# Patient Record
Sex: FEMALE | Race: WHITE
[De-identification: names, ages, dates, MRNs, and addresses within clinical notes are randomized per-mention and may not be internally consistent; named-entity substitution may affect disease eponyms.]

---

## 2021-04-23 ENCOUNTER — HOSPITAL ENCOUNTER (EMERGENCY)
Dept: HOSPITAL 76 - ED | Age: 26
Discharge: HOME | End: 2021-04-23
Payer: COMMERCIAL

## 2021-04-23 VITALS — DIASTOLIC BLOOD PRESSURE: 68 MMHG | SYSTOLIC BLOOD PRESSURE: 117 MMHG

## 2021-04-23 DIAGNOSIS — O41.8X10: ICD-10-CM

## 2021-04-23 DIAGNOSIS — Z3A.09: ICD-10-CM

## 2021-04-23 DIAGNOSIS — R10.2: ICD-10-CM

## 2021-04-23 DIAGNOSIS — O26.891: Primary | ICD-10-CM

## 2021-04-23 LAB
ANION GAP SERPL CALCULATED.4IONS-SCNC: 10 MMOL/L (ref 6–13)
BASOPHILS NFR BLD AUTO: 0 10^3/UL (ref 0–0.1)
BASOPHILS NFR BLD AUTO: 0.2 %
BUN SERPL-MCNC: 11 MG/DL (ref 6–20)
CALCIUM UR-MCNC: 9.5 MG/DL (ref 8.5–10.3)
CHLORIDE SERPL-SCNC: 108 MMOL/L (ref 101–111)
CO2 SERPL-SCNC: 21 MMOL/L (ref 21–32)
CREAT SERPLBLD-SCNC: 0.4 MG/DL (ref 0.4–1)
EOSINOPHIL # BLD AUTO: 0.4 10^3/UL (ref 0–0.7)
EOSINOPHIL NFR BLD AUTO: 2.8 %
ERYTHROCYTE [DISTWIDTH] IN BLOOD BY AUTOMATED COUNT: 14 % (ref 12–15)
GFRSERPLBLD MDRD-ARVRAT: 196 ML/MIN/{1.73_M2} (ref 89–?)
GLUCOSE SERPL-MCNC: 100 MG/DL (ref 70–100)
HCT VFR BLD AUTO: 41.8 % (ref 37–47)
HGB UR QL STRIP: 13.6 G/DL (ref 12–16)
LYMPHOCYTES # SPEC AUTO: 3.1 10^3/UL (ref 1.5–3.5)
LYMPHOCYTES NFR BLD AUTO: 21.5 %
MCH RBC QN AUTO: 27 PG (ref 27–31)
MCHC RBC AUTO-ENTMCNC: 32.5 G/DL (ref 32–36)
MCV RBC AUTO: 83.1 FL (ref 81–99)
MONOCYTES # BLD AUTO: 0.9 10^3/UL (ref 0–1)
MONOCYTES NFR BLD AUTO: 6.5 %
NEUTROPHILS # BLD AUTO: 9.8 10^3/UL (ref 1.5–6.6)
NEUTROPHILS # SNV AUTO: 14.4 X10^3/UL (ref 4.8–10.8)
NEUTROPHILS NFR BLD AUTO: 68.6 %
NRBC # BLD AUTO: 0 /100WBC
NRBC # BLD AUTO: 0 X10^3/UL
PDW BLD AUTO: 9.5 FL (ref 7.9–10.8)
PLATELET # BLD: 295 10^3/UL (ref 130–450)
POTASSIUM SERPL-SCNC: 3.5 MMOL/L (ref 3.5–5)
RBC MAR: 5.03 10^6/UL (ref 4.2–5.4)
SODIUM SERPLBLD-SCNC: 139 MMOL/L (ref 135–145)

## 2021-04-23 PROCEDURE — 36415 COLL VENOUS BLD VENIPUNCTURE: CPT

## 2021-04-23 PROCEDURE — 76801 OB US < 14 WKS SINGLE FETUS: CPT

## 2021-04-23 PROCEDURE — 85025 COMPLETE CBC W/AUTO DIFF WBC: CPT

## 2021-04-23 PROCEDURE — 86900 BLOOD TYPING SEROLOGIC ABO: CPT

## 2021-04-23 PROCEDURE — 80048 BASIC METABOLIC PNL TOTAL CA: CPT

## 2021-04-23 PROCEDURE — 99284 EMERGENCY DEPT VISIT MOD MDM: CPT

## 2021-04-23 PROCEDURE — 76817 TRANSVAGINAL US OBSTETRIC: CPT

## 2021-04-23 PROCEDURE — 84702 CHORIONIC GONADOTROPIN TEST: CPT

## 2021-04-23 PROCEDURE — 86901 BLOOD TYPING SEROLOGIC RH(D): CPT

## 2021-04-23 NOTE — EXTERNAL MEDICAL SUMMARY RPT
Continuity of Care Document

                            Created on:2021



Patient:LIANNE LI

Sex:Female

:1995

External Reference #:[id number]





Demographics







                          Phone                     Unavailable

 

                          Preferred Language        Unknown

 

                          Marital Status            Unknown

 

                          Congregation Affiliation     Unknown

 

                          Race                      Unknown

 

                          Ethnic Group              Unknown









Author







                          Organization              [facility]

 

                          Address                    [place] [id number]

 

                          Phone                     [phone]









Social History







                     date                description         facility

 

                     [id number]

## 2021-04-23 NOTE — ED PHYSICIAN DOCUMENTATION
PD HPI ABD PAIN





- Stated complaint


Stated Complaint: ABD PX,NAUSEA





- Chief complaint


Chief Complaint: Abd Pain





- History obtained from


History obtained from: Patient





- Additional information


Additional information: 





She has been breast-feeding her son socially has not had any menses.  Maybe a 

slight one in January.  She found out she was pregnant about a month ago.  She 

does not know how far along.  Last night she started having cramping.  There is 

no associating bleeding.  She has been nauseous today as well.





Review of Systems


Ten Systems: 10 systems reviewed and negative


Constitutional: reports: Reviewed and negative


Throat: reports: Reviewed and negative


Cardiac: reports: Reviewed and negative





PD PAST MEDICAL HISTORY





- Past Medical History


Cardiovascular: None


Respiratory: None


GI: GERD





- Past Surgical History


Past Surgical History: Yes


General: Cholecystectomy





- Present Medications


Home Medications: 


                                Ambulatory Orders











 Medication  Instructions  Recorded  Confirmed


 


Metoclopramide [Reglan] 10 mg PO Q6H PRN #20 tablet 04/23/21 


 


Sertraline [Zoloft] 75 mg PO DAILY 04/23/21 04/23/21














- Allergies


Allergies/Adverse Reactions: 


                                    Allergies











Allergy/AdvReac Type Severity Reaction Status Date / Time


 


Latex, Natural Rubber Allergy  Rash Verified 04/23/21 17:43














- Social History


Does the pt smoke?: No


Smoking Status: Never smoker


Does the pt drink ETOH?: Yes


Does the pt have substance abuse?: No





- Immunizations


Immunizations are current?: Yes





- POLST


Patient has POLST: No





PD ED PE NORMAL





- Vitals


Vital signs reviewed: Yes





- General


General: Alert and oriented X 3, No acute distress





- HEENT


HEENT: PERRL, EOMI





- Neck


Neck: Supple, no meningeal sign, No bony TTP





- Cardiac


Cardiac: RRR, No murmur





- Respiratory


Respiratory: No respiratory distress, Clear bilaterally





- Abdomen


Abdomen: Non tender





- Female 


Female : Other (I was unable to visualize a intrauterine pregnancy on bedside 

ultrasound.)





- Neuro


Neuro: Alert and oriented X 3, Normal speech





- Psych


Psych: Normal mood, Normal affect





Results





- Vitals


Vitals: 


                               Vital Signs - 24 hr











  04/23/21 04/23/21





  17:35 20:00


 


Temperature 36.7 C 36.7 C


 


Heart Rate 79 75


 


Respiratory 16 16





Rate  


 


Blood Pressure 124/73 117/68


 


O2 Saturation 100 100








                                     Oxygen











O2 Source                      Room air

















- Labs


Labs: 


                                Laboratory Tests











  04/23/21 04/23/21 04/23/21





  18:21 18:21 18:21


 


WBC   14.4 H 


 


RBC   5.03 


 


Hgb   13.6 


 


Hct   41.8 


 


MCV   83.1 


 


MCH   27.0 


 


MCHC   32.5 


 


RDW   14.0 


 


Plt Count   295 


 


MPV   9.5 


 


Neut # (Auto)   9.8 H 


 


Lymph # (Auto)   3.1 


 


Mono # (Auto)   0.9 


 


Eos # (Auto)   0.4 


 


Baso # (Auto)   0.0 


 


Absolute Nucleated RBC   0.00 


 


Nucleated RBC %   0.0 


 


Sodium    139


 


Potassium    3.5


 


Chloride    108


 


Carbon Dioxide    21


 


Anion Gap    10.0


 


BUN    11


 


Creatinine    0.4


 


Estimated GFR (MDRD)    196


 


Glucose    100


 


Calcium    9.5


 


HCG, Quant   


 


Blood Type  O POSITIVE  














  04/23/21





  18:21


 


WBC 


 


RBC 


 


Hgb 


 


Hct 


 


MCV 


 


MCH 


 


MCHC 


 


RDW 


 


Plt Count 


 


MPV 


 


Neut # (Auto) 


 


Lymph # (Auto) 


 


Mono # (Auto) 


 


Eos # (Auto) 


 


Baso # (Auto) 


 


Absolute Nucleated RBC 


 


Nucleated RBC % 


 


Sodium 


 


Potassium 


 


Chloride 


 


Carbon Dioxide 


 


Anion Gap 


 


BUN 


 


Creatinine 


 


Estimated GFR (MDRD) 


 


Glucose 


 


Calcium 


 


HCG, Quant  601209.00


 


Blood Type 














PD MEDICAL DECISION MAKING





- ED course


ED course: 





25-year-old woman presents with pelvic pain in pregnancy.  Per ultrasonographer 

she has a live IUP with small subchorionic hemorrhage consistent with a 9-week 

0-day gestation.





Departure





- Departure


Disposition: 01 Home, Self Care


Clinical Impression: 


Pregnancy


Qualifiers:


 Weeks of gestation: 9 weeks Qualified Code(s): Z3A.09 - 9 weeks gestation of 

pregnancy





Pelvic pain affecting pregnancy


Qualifiers:


 Trimester: first trimester Qualified Code(s): O26.891 - Other specified 

pregnancy related conditions, first trimester





Instructions:  ED Prenatal Care, ED Preg Established Normal Sxs


Follow-Up: 


[facility] [Provider Group]


Prescriptions: 


Metoclopramide [Reglan] 10 mg PO Q6H PRN #20 tablet


 PRN Reason: nausea or headache


Comments: 


Estimated due date November 26, 2021.  Return if worsening.


Discharge Date/Time: 04/23/21 20:44

## 2021-05-14 ENCOUNTER — HOSPITAL ENCOUNTER (OUTPATIENT)
Dept: HOSPITAL 76 - LAB.WC | Age: 26
Discharge: HOME | End: 2021-05-14
Attending: ADVANCED PRACTICE MIDWIFE
Payer: COMMERCIAL

## 2021-05-14 DIAGNOSIS — Z32.01: Primary | ICD-10-CM

## 2021-05-14 LAB
AMPHET UR QL SCN: NEGATIVE
BARBITURATES UR QL SCN>300 NG/ML: NEGATIVE
BENZODIAZ UR QL SCN: NEGATIVE
CLARITY UR REFRACT.AUTO: CLEAR
COCAINE UR-SCNC: NEGATIVE UMOL/L
CRYSTALS URNS QL MICRO: (no result) /LPF
GLUCOSE UR QL STRIP.AUTO: NEGATIVE MG/DL
KETONES UR QL STRIP.AUTO: NEGATIVE MG/DL
METHADONE UR QL SCN: NEGATIVE
METHAMPHET UR QL SCN: NEGATIVE
NITRITE UR QL STRIP.AUTO: NEGATIVE
OPIATES UR QL SCN: NEGATIVE
PH UR STRIP.AUTO: 5.5 PH (ref 5–7.5)
PROT UR STRIP.AUTO-MCNC: 30 MG/DL
RBC # UR STRIP.AUTO: (no result) /UL
RBC # URNS HPF: (no result) /HPF (ref 0–5)
SP GR UR STRIP.AUTO: >=1.03 (ref 1–1.03)
SQUAMOUS URNS QL MICRO: (no result)
THC UR QL SCN: NEGATIVE
UROBILINOGEN UR QL STRIP.AUTO: (no result) E.U./DL
UROBILINOGEN UR STRIP.AUTO-MCNC: NEGATIVE MG/DL
VOLATILE DRUGS POS SERPL SCN: (no result)
WBC # UR MANUAL: (no result) /HPF (ref 0–5)

## 2021-05-14 PROCEDURE — 80306 DRUG TEST PRSMV INSTRMNT: CPT

## 2021-05-14 PROCEDURE — 87086 URINE CULTURE/COLONY COUNT: CPT

## 2021-05-14 PROCEDURE — 81001 URINALYSIS AUTO W/SCOPE: CPT

## 2021-05-25 ENCOUNTER — HOSPITAL ENCOUNTER (OUTPATIENT)
Dept: HOSPITAL 76 - DI | Age: 26
Discharge: HOME | End: 2021-05-25
Attending: ADVANCED PRACTICE MIDWIFE
Payer: COMMERCIAL

## 2021-05-25 DIAGNOSIS — Z36.89: ICD-10-CM

## 2021-05-25 DIAGNOSIS — Z32.01: Primary | ICD-10-CM

## 2021-05-25 LAB
BASOPHILS NFR BLD AUTO: 0 10^3/UL (ref 0–0.1)
BASOPHILS NFR BLD AUTO: 0.2 %
EOSINOPHIL # BLD AUTO: 0.4 10^3/UL (ref 0–0.7)
EOSINOPHIL NFR BLD AUTO: 2.9 %
ERYTHROCYTE [DISTWIDTH] IN BLOOD BY AUTOMATED COUNT: 13.9 % (ref 12–15)
HCT VFR BLD AUTO: 40.1 % (ref 37–47)
HGB UR QL STRIP: 13 G/DL (ref 12–16)
LYMPHOCYTES # SPEC AUTO: 3.1 10^3/UL (ref 1.5–3.5)
LYMPHOCYTES NFR BLD AUTO: 22.4 %
MCH RBC QN AUTO: 27.1 PG (ref 27–31)
MCHC RBC AUTO-ENTMCNC: 32.4 G/DL (ref 32–36)
MCV RBC AUTO: 83.7 FL (ref 81–99)
MONOCYTES # BLD AUTO: 0.9 10^3/UL (ref 0–1)
MONOCYTES NFR BLD AUTO: 6.5 %
NEUTROPHILS # BLD AUTO: 9.3 10^3/UL (ref 1.5–6.6)
NEUTROPHILS # SNV AUTO: 13.8 X10^3/UL (ref 4.8–10.8)
NEUTROPHILS NFR BLD AUTO: 67.2 %
NRBC # BLD AUTO: 0 /100WBC
NRBC # BLD AUTO: 0 X10^3/UL
PDW BLD AUTO: 9.6 FL (ref 7.9–10.8)
PLATELET # BLD: 261 10^3/UL (ref 130–450)
RBC MAR: 4.79 10^6/UL (ref 4.2–5.4)

## 2021-05-25 PROCEDURE — 36415 COLL VENOUS BLD VENIPUNCTURE: CPT

## 2021-05-25 PROCEDURE — 86762 RUBELLA ANTIBODY: CPT

## 2021-05-25 PROCEDURE — 85025 COMPLETE CBC W/AUTO DIFF WBC: CPT

## 2021-05-25 PROCEDURE — 87340 HEPATITIS B SURFACE AG IA: CPT

## 2021-05-25 PROCEDURE — 86850 RBC ANTIBODY SCREEN: CPT

## 2021-05-25 PROCEDURE — 86592 SYPHILIS TEST NON-TREP QUAL: CPT

## 2021-05-25 PROCEDURE — 86900 BLOOD TYPING SEROLOGIC ABO: CPT

## 2021-05-25 PROCEDURE — 86787 VARICELLA-ZOSTER ANTIBODY: CPT

## 2021-05-25 PROCEDURE — 86901 BLOOD TYPING SEROLOGIC RH(D): CPT

## 2021-05-25 PROCEDURE — 86803 HEPATITIS C AB TEST: CPT

## 2021-05-25 PROCEDURE — 87389 HIV-1 AG W/HIV-1&-2 AB AG IA: CPT

## 2021-05-27 NOTE — ULTRASOUND REPORT
PROCEDURE:  OB 14+ Weeks

 

INDICATIONS:  Positive pregnancy test

 

OUTSIDE/PRIOR DATING DATA:  

Last menstrual period (LMP): Unknown.  

LMP-based estimated date of delivery (BLANCA): Unknown.  

First dating scan (date and location): 4/23/2021.  

Estimated date of delivery (BLANCA) from first dating scan: 11/26/2021.  

The below data below was generated using the BLANCA of 11/26/2020

 

TECHNIQUE:  

Real-time scanning was performed of the fetus, with image documentation and biometric measurements.  


Endovaginal scanning:  Not performed

 

COMPARISON:  None.

 

 

FINDINGS:  

 

General:  A single living intrauterine gestation is present.  

Placenta:  Placental position is posterior, without previa.  

Fetal heart rate:  148 beats per minute.  

Maternal cervical canal: 4.8 cm long; normal length is 2.5 cm or more.  

 

Fetal biometrics:  

Biparietal diameter:  2.5 cm, 14 weeks 1 day.

Head circumference:  9.2 cm, 14 weeks 1 day.

Abdominal circumference:  7.5 cm, 14 weeks 0 days.

Femur length:  1.4 cm, 14 weeks 2 days.

Estimated gestational age from initial scan: not applicable.  

Composite gestational age from present scan:  14 weeks 1 day.

 

 

IMPRESSION:  Single live intrauterine gestation with composite gestational age of 14 weeks 1 day.

 

Reviewed by: Timo Luna MD on 5/27/2021 8:13 AM PDT

Approved by: Timo Luna MD on 5/27/2021 8:13 AM PDT

 

 

Station ID:  [id number]

## 2021-06-30 ENCOUNTER — HOSPITAL ENCOUNTER (OUTPATIENT)
Dept: HOSPITAL 76 - LAB.WC | Age: 26
Discharge: HOME | End: 2021-06-30
Attending: ADVANCED PRACTICE MIDWIFE
Payer: COMMERCIAL

## 2021-06-30 DIAGNOSIS — Z34.90: Primary | ICD-10-CM

## 2021-06-30 LAB
AMPHET UR QL SCN: NEGATIVE
BARBITURATES UR QL SCN>300 NG/ML: NEGATIVE
BENZODIAZ UR QL SCN: NEGATIVE
C TRACH DNA SPEC NAA+PROBE-ACNC: NEGATIVE
COCAINE UR-SCNC: NEGATIVE UMOL/L
METHADONE UR QL SCN: NEGATIVE
METHAMPHET UR QL SCN: NEGATIVE
N GONORRHOEA DNA GENITAL QL NAA+PROBE: NEGATIVE
OPIATES UR QL SCN: NEGATIVE
T VAGINALIS RRNA GENITAL QL PROBE: NEGATIVE
THC UR QL SCN: NEGATIVE
VOLATILE DRUGS POS SERPL SCN: (no result)

## 2021-06-30 PROCEDURE — 87086 URINE CULTURE/COLONY COUNT: CPT

## 2021-06-30 PROCEDURE — 87491 CHLMYD TRACH DNA AMP PROBE: CPT

## 2021-06-30 PROCEDURE — 80306 DRUG TEST PRSMV INSTRMNT: CPT

## 2021-06-30 PROCEDURE — 87661 TRICHOMONAS VAGINALIS AMPLIF: CPT

## 2021-06-30 PROCEDURE — 87591 N.GONORRHOEAE DNA AMP PROB: CPT

## 2021-07-14 ENCOUNTER — HOSPITAL ENCOUNTER (OUTPATIENT)
Dept: HOSPITAL 76 - DI | Age: 26
Discharge: HOME | End: 2021-07-14
Attending: ADVANCED PRACTICE MIDWIFE
Payer: COMMERCIAL

## 2021-07-14 DIAGNOSIS — O44.22: Primary | ICD-10-CM

## 2021-07-14 DIAGNOSIS — Z3A.20: ICD-10-CM

## 2021-07-15 NOTE — ULTRASOUND REPORT
PROCEDURE:  OB Detailed Fetal Eval

 

INDICATIONS:  SUPERVISION OF NORMAL PREGNANCY

 

OUTSIDE/PRIOR DATING DATA:  

Last menstrual period (LMP): Unknown.  

LMP-based estimated date of delivery (BLANCA): Not applicable.  

First dating scan (date and location): 4/23/2021.  

Estimated date of delivery (BLANCA) from first dating scan: 11/26/2021.  

The below data below was generated using the ultrasound BLANCA of 11/26/2021

 

TECHNIQUE: 

Real-time scanning was performed of the fetus, with image documentation and biometric measurements.  


 

COMPARISON:  5/25/2021

 

FINDINGS:     

 

General:  A single living intrauterine gestation is present.  

Presentation:  Cephalic

Placenta:  Placental position is posterior, marginal placenta previa.  

Amniotic fluid index:  15.7 cm.  Largest pocket 4.6 cm

Fetal heart rate:  148 beats per minute.  

Maternal cervical canal:  3.7 cm long; normal length is 2.5 cm or more.  

 

Fetal biometrics:  

Biparietal diameter:  4.5 cm. 19 weeks 4 days

Head circumference:  17.7 cm. 20 weeks 1 day

Abdominal circumference:  16.5 cm. 21 weeks 4 days

Femur length:  3.4 cm. 19 weeks 6 days

Estimated gestational age from initial scan: not applicable.  

Composite gestational age from present scan:  20 weeks 3 days

Estimated fetal weight and percentile:  394 g. 62.7 percentile

Measurement variability in biometric dating: +/- 10 days from 12-20 weeks gestation, +/- 2 weeks from
 20-30 weeks gestation, +/- 3 weeks at 30 weeks gestation or later.  

 

Anatomic survey:  

Neuro:  Ventricles are normal at less than 10 mm.  Cisterna magna is normal at 3-11 mm.  Cerebellum i
s normal in size and morphology.  

Nuchal skin fold:  Normal at less than 6 mm between 14 and 20 weeks gestational age.  

Face:  Nose and lips, facial profile are normal.  

Spine:  No evidence for spina bifida.  

Heart: Views of the heart are limited. Diaphragm:  Diaphragm is intact.  

Stomach:  Left-sided stomach is present.  

Kidneys:  No fetal hydronephrosis.  Normal is less than 5 mm in 2nd trimester, less than 7 mm in 3rd 
trimester.  

Cord: Cord insertion is not well visualized. Bladder:  Normal in size.  

Extremities:  All 4 extremities are visualized.  

 

IMPRESSION:  

1. Composite gestational age 20 weeks 3 days. 

2. Estimated fetal weight and percentile 394 g. 62.7 percentile.

3. Marginal placenta previa.

 

Reviewed by: Enrique Peralta on 7/15/2021 4:13 PM PDT

Approved by: Enrique Peralta on 7/15/2021 4:13 PM PDT

 

 

Station ID:  [id number]

## 2021-08-17 ENCOUNTER — HOSPITAL ENCOUNTER (OUTPATIENT)
Dept: HOSPITAL 76 - DI | Age: 26
Discharge: HOME | End: 2021-08-17
Attending: ADVANCED PRACTICE MIDWIFE
Payer: COMMERCIAL

## 2021-08-17 DIAGNOSIS — O44.12: Primary | ICD-10-CM

## 2021-08-17 DIAGNOSIS — Z3A.25: ICD-10-CM

## 2021-08-18 NOTE — ULTRASOUND REPORT
PROCEDURE:  OB F/U or Repeat

 

INDICATIONS:  PLACENTA PREVIA

 

OUTSIDE/PRIOR DATING DATA:  

Last menstrual period (LMP): Not available.  

LMP-based estimated date of delivery (BLANCA): Not available.  

First dating scan (date and location): 4/23/2021.  

Estimated date of delivery (BLANCA) from first dating scan: 11/26/2021.  

The below data below was generated using the above BLANCA of 11/26/2021

 

TECHNIQUE: 

Real-time scanning was performed of the fetus, with image documentation and biometric measurements.  


Endovaginal scanning:  Not needed

 

COMPARISON:  All prior OB ultrasounds for this pregnancy.

 

FINDINGS:  

 

General:  A single living intrauterine gestation is present.  

Presentation:  Variable

Placenta:  Placental position is posterior, without previa.  

Amniotic fluid index:  18.5 cm, normal for gestational age.  

Fetal heart rate:  148 beats per minute.  

Maternal cervical canal:  4.0 cm long; normal length is 2.5 cm or more.  

 

Fetal biometrics:  

Esmated gestational age from initial scan: 25 weeks 4 days.  

 

Other: The inferior placental tip relationship to the internal os of the cervical canal is 9 cm. Ther
e is no evidence of placenta previa. The marginal cord insertion visualization is improved from the p
rior study and the cord insertion is 1.7 cm from the placental border. Three-vessel cord is noted. No
rmal cardiac anatomy including ventricular outflow tracts currently found.

.  

 

IMPRESSION:  Current fetal positioning is variable. There is no sign of placenta previa. Marginal cor
d insertion is better visualized than on prior study and measures 1.7 cm from the placental border. N
ormal ventricular outflow tracts and 4 chamber view of heart, three-vessel umbilical cord is well vis
ualized.

 

Reviewed by: Jamar Wyatt MD on 8/18/2021 11:13 AM PDT

Approved by: Jamar Wyatt MD on 8/18/2021 11:13 AM PDT

 

 

Station ID:  [id number]

## 2021-08-24 ENCOUNTER — HOSPITAL ENCOUNTER (OUTPATIENT)
Dept: HOSPITAL 76 - LAB | Age: 26
Discharge: HOME | End: 2021-08-24
Attending: ADVANCED PRACTICE MIDWIFE
Payer: COMMERCIAL

## 2021-08-24 DIAGNOSIS — N76.0: Primary | ICD-10-CM

## 2021-08-24 LAB
BV DNA PNL VAG NAA+PROBE: NEGATIVE
C GLABRATA DNA BLD QL NAA+PROBE: NEGATIVE
C KRUSEI DNA VAG QL NAA+PROBE: NEGATIVE
CANDIDA DNA VAG QL NAA+PROBE: NEGATIVE
T VAGINALIS RRNA GENITAL QL PROBE: NEGATIVE

## 2021-08-24 PROCEDURE — 87661 TRICHOMONAS VAGINALIS AMPLIF: CPT

## 2021-08-24 PROCEDURE — 87801 DETECT AGNT MULT DNA AMPLI: CPT

## 2021-09-03 ENCOUNTER — HOSPITAL ENCOUNTER (OUTPATIENT)
Dept: HOSPITAL 76 - LAB | Age: 26
Discharge: HOME | End: 2021-09-03
Attending: ADVANCED PRACTICE MIDWIFE
Payer: COMMERCIAL

## 2021-09-03 DIAGNOSIS — Z34.90: Primary | ICD-10-CM

## 2021-09-03 DIAGNOSIS — Z36.89: ICD-10-CM

## 2021-09-03 LAB
ERYTHROCYTE [DISTWIDTH] IN BLOOD BY AUTOMATED COUNT: 14.9 % (ref 12–15)
HCT VFR BLD AUTO: 39.4 % (ref 37–47)
HGB UR QL STRIP: 12.3 G/DL (ref 12–16)
MCH RBC QN AUTO: 26.4 PG (ref 27–31)
MCHC RBC AUTO-ENTMCNC: 31.2 G/DL (ref 32–36)
MCV RBC AUTO: 84.5 FL (ref 81–99)
NEUTROPHILS # SNV AUTO: 14.8 X10^3/UL (ref 4.8–10.8)
PDW BLD AUTO: 10.1 FL (ref 7.9–10.8)
PLATELET # BLD: 264 10^3/UL (ref 130–450)
RBC MAR: 4.66 10^6/UL (ref 4.2–5.4)

## 2021-09-03 PROCEDURE — 36415 COLL VENOUS BLD VENIPUNCTURE: CPT

## 2021-09-03 PROCEDURE — 85027 COMPLETE CBC AUTOMATED: CPT

## 2021-09-03 PROCEDURE — 82950 GLUCOSE TEST: CPT

## 2021-10-24 ENCOUNTER — HOSPITAL ENCOUNTER (OUTPATIENT)
Dept: HOSPITAL 76 - DI | Age: 26
Discharge: HOME | End: 2021-10-24
Attending: ADVANCED PRACTICE MIDWIFE
Payer: COMMERCIAL

## 2021-10-24 DIAGNOSIS — O26.843: Primary | ICD-10-CM

## 2021-10-24 DIAGNOSIS — Z3A.35: ICD-10-CM

## 2021-10-25 NOTE — ULTRASOUND REPORT
PROCEDURE:  OB F/U or Repeat

 

INDICATIONS:  UTERINE SIZE DATE DISCREPANCY 3RD TRIMESTER

 

OUTSIDE/PRIOR DATING DATA:  

Last menstrual period (LMP): None.  

First dating scan (date): April 23, 2021.  

Estimated date of delivery (BLANCA) from first dating scan: November 26, 2021.  

The below data below was generated using the ultrasound BLANCA of November 26, 2021

 

TECHNIQUE: 

Real-time scanning was performed of the fetus, with image documentation and biometric measurements.  


 

COMPARISON:  August 17, 2021

 

FINDINGS:  

 

General:  A single living intrauterine gestation is present.  

Presentation:  Cephalic

Placenta:  Placental position is posterior, without previa.  

Amniotic fluid index:  17.3 cm, appropriate for gestational age.  

Fetal heart rate:  144 beats per minute.  

Maternal cervical canal: Not well seen.

Fetal biometrics:  

Biparietal diameter:  8.79 cm

Head circumference:  31.8 cm

Abdominal circumference:  31.4 cm

Femur length:  6.6 cm

Estimated gestational age from initial scan: not applicable.  

Composite gestational age from present scan:  35 weeks, 1 day

Estimated fetal weight and percentile: 2570.6 g +/- 380 g; 39.8 percentile

Measurement variability in biometric dating: +/- 10 days from 12-20 weeks gestation, +/- 2 weeks from
 20-30 weeks gestation, +/- 3 weeks at 30 weeks gestation or more.  

 

Other:  Not applicable.  

 

IMPRESSION:  Live single intrauterine gestation as detailed above.

 

Reviewed by: Melchor Magallon MD on 10/25/2021 8:22 AM PDT

Approved by: Melchor Magallon MD on 10/25/2021 8:22 AM PDT

 

 

Station ID:  [id number]

## 2021-11-05 ENCOUNTER — HOSPITAL ENCOUNTER (OUTPATIENT)
Dept: HOSPITAL 76 - LAB.WC | Age: 26
Discharge: HOME | End: 2021-11-05
Attending: ADVANCED PRACTICE MIDWIFE
Payer: COMMERCIAL

## 2021-11-05 DIAGNOSIS — Z36.85: Primary | ICD-10-CM

## 2021-11-05 PROCEDURE — 87797 DETECT AGENT NOS DNA DIR: CPT

## 2021-11-22 ENCOUNTER — HOSPITAL ENCOUNTER (INPATIENT)
Dept: HOSPITAL 76 - WFO | Age: 26
LOS: 2 days | Discharge: HOME | End: 2021-11-24
Attending: ADVANCED PRACTICE MIDWIFE | Admitting: ADVANCED PRACTICE MIDWIFE
Payer: COMMERCIAL

## 2021-11-22 DIAGNOSIS — F31.9: ICD-10-CM

## 2021-11-22 DIAGNOSIS — K21.9: ICD-10-CM

## 2021-11-22 DIAGNOSIS — O99.613: ICD-10-CM

## 2021-11-22 DIAGNOSIS — Z79.899: ICD-10-CM

## 2021-11-22 DIAGNOSIS — Z3A.39: ICD-10-CM

## 2021-11-22 DIAGNOSIS — F41.9: ICD-10-CM

## 2021-11-22 LAB
BASOPHILS NFR BLD AUTO: 0 10^3/UL (ref 0–0.1)
BASOPHILS NFR BLD AUTO: 0.2 %
EOSINOPHIL # BLD AUTO: 0.2 10^3/UL (ref 0–0.7)
EOSINOPHIL NFR BLD AUTO: 1.6 %
ERYTHROCYTE [DISTWIDTH] IN BLOOD BY AUTOMATED COUNT: 16 % (ref 12–15)
HCT VFR BLD AUTO: 38.9 % (ref 37–47)
HGB UR QL STRIP: 12.4 G/DL (ref 12–16)
LYMPHOCYTES # SPEC AUTO: 2.5 10^3/UL (ref 1.5–3.5)
LYMPHOCYTES NFR BLD AUTO: 17.7 %
MCH RBC QN AUTO: 26.6 PG (ref 27–31)
MCHC RBC AUTO-ENTMCNC: 31.9 G/DL (ref 32–36)
MCV RBC AUTO: 83.5 FL (ref 81–99)
MONOCYTES # BLD AUTO: 0.7 10^3/UL (ref 0–1)
MONOCYTES NFR BLD AUTO: 5.1 %
NEUTROPHILS # BLD AUTO: 10.5 10^3/UL (ref 1.5–6.6)
NEUTROPHILS # SNV AUTO: 14 X10^3/UL (ref 4.8–10.8)
NEUTROPHILS NFR BLD AUTO: 74.6 %
NRBC # BLD AUTO: 0 /100WBC
NRBC # BLD AUTO: 0 X10^3/UL
PDW BLD AUTO: 12.4 FL (ref 7.9–10.8)
PLATELET # BLD: 249 10^3/UL (ref 130–450)
RBC MAR: 4.66 10^6/UL (ref 4.2–5.4)

## 2021-11-22 PROCEDURE — 85025 COMPLETE CBC W/AUTO DIFF WBC: CPT

## 2021-11-22 PROCEDURE — 36415 COLL VENOUS BLD VENIPUNCTURE: CPT

## 2021-11-22 PROCEDURE — 86850 RBC ANTIBODY SCREEN: CPT

## 2021-11-22 PROCEDURE — 10907ZC DRAINAGE OF AMNIOTIC FLUID, THERAPEUTIC FROM PRODUCTS OF CONCEPTION, VIA NATURAL OR ARTIFICIAL OPENING: ICD-10-PCS | Performed by: ADVANCED PRACTICE MIDWIFE

## 2021-11-22 PROCEDURE — 86900 BLOOD TYPING SEROLOGIC ABO: CPT

## 2021-11-22 PROCEDURE — 86901 BLOOD TYPING SEROLOGIC RH(D): CPT

## 2021-11-22 PROCEDURE — 3E033VJ INTRODUCTION OF OTHER HORMONE INTO PERIPHERAL VEIN, PERCUTANEOUS APPROACH: ICD-10-PCS | Performed by: ADVANCED PRACTICE MIDWIFE

## 2021-11-22 RX ADMIN — SODIUM CHLORIDE, POTASSIUM CHLORIDE, SODIUM LACTATE AND CALCIUM CHLORIDE SCH MLS/HR: 600; 310; 30; 20 INJECTION, SOLUTION INTRAVENOUS at 18:20

## 2021-11-22 RX ADMIN — Medication SCH MLS/HR: at 18:20

## 2021-11-22 RX ADMIN — CALCIUM CARBONATE (ANTACID) CHEW TAB 500 MG PRN MG: 500 CHEW TAB at 18:18

## 2021-11-22 NOTE — ANESTHESIA
Pre-Anesthesia VS, & Labs





- Diagnosis





Active labor





- Procedure





vaginal delivery


Vital Signs: 





                                        











Temp Pulse Resp BP Pulse Ox


 


 37.0 C             


 


 21 17:20            











Height: 5 ft 5 in


Weight (kg): 116.755 kg


Body Mass Index: 42.8


BMI Classification: Morbidly Obese





- NPO


Other (clear liquids)





- Pregnancy


Is Patient Pregnant?: Yes





- Lab Results


Current Lab Results: 





Laboratory Tests





21 17:05: WBC 14.0 H, RBC 4.66, Hgb 12.4, Hct 38.9, MCV 83.5, MCH 26.6 L, 

MCHC 31.9 L, RDW 16.0 H, Plt Count 249, MPV 12.4 H, Neut # (Auto) 10.5 H, Lymph 

# (Auto) 2.5, Mono # (Auto) 0.7, Eos # (Auto) 0.2, Baso # (Auto) 0.0, Absolute 

Nucleated RBC 0.00, Nucleated RBC % 0.0


21 17:05: Blood Type O POSITIVE, Antibody Screen NEGATIVE








Fish Bones: 


                                 21 17:05








Home Medications and Allergies





Active Medications





Calcium Carbonate/Glycine (Calcium Carbonate Chew 500 Mg Tablet)  1,000 mg PO 

Q3H PRN


   PRN Reason: Heartburn


   Last Admin: 21 18:18 Dose:  1,000 mg


   Documented by: 


Carboprost Tromethamine (Carboprost Tromethamine 250 Mcg/Ml Amp)  250 mcg IM 

Q15M PRN


   PRN Reason: Step 4: Hemorrhage protocol


   Stop: 21 17:41


Lactated Ringer's (Lr)  1,000 mls @ 100 mls/hr IV .Q10H ALEKSANDR


   Last Admin: 21 18:20 Dose:  100 mls/hr


   Documented by: 


Oxytocin/Sodium Chloride (Pitocin/Sodium Chloride)  500 mls @ 999 mls/hr IV PRN 

PRN; Protocol


   PRN Reason: POST-PARTUM HEMORR PREVENTION


   Stop: 21 17:41


Tranexamic Acid (Tranexamic 1,000 Mg/100ml-Nacl)  1,000 mg in 100 mls @ 600 

mls/hr IV .ONCE PRN


   PRN Reason: EBL >1200mL and within 3hr


   Stop: 21 17:41


Oxytocin/Sodium Chloride (Pitocin/Sodium Chloride)  500 mls @ 2 mls/hr IV TITR 

ALEKSANDR; Protocol


   Last Titration: 21 21:00 Dose:  7 milliunit/min, 7 mls/hr


   Documented by: 


Lidocaine HCl (Lidocaine-Mpf 1% 30 Ml Vial)  30 ml ID .ONCE PRN


   PRN Reason: PERINEAL REPAIR


   Stop: 21 17:41


Methylergonovine Maleate (Methylergonovine 0.2 Mg/Ml Vial)  0.2 mg IM .ONCE PRN


   PRN Reason: Step 2: Hemorrhage protocol


   Stop: 21 17:41


Misoprostol (Misoprostol 200 Mcg Tablet)  800 mcg BC .ONCE PRN


   PRN Reason: Step 3: Hemorrhage protocol


   Stop: 21 17:41


Ondansetron HCl (Ondansetron 4 Mg/2 Ml Vial)  4 mg IVP Q4HR PRN


   PRN Reason: Nausea / Vomiting


Oxytocin (Oxytocin 10 Unit/Ml Vial)  10 unit IM .ONCE PRN


   PRN Reason: Step one: If no IV access


   Stop: 21 17:41


Sertraline HCl (Sertraline 50 Mg Tablet)  150 mg PO DAILY ALEKSANDR


Sodium Chloride (Sodium Chloride Flush 0.9% 10 Ml Syringe)  10 ml IVP 0100

,0900,1700 ALEKSANDR


Sodium Chloride (Sodium Chloride Flush 0.9% 10 Ml Syringe)  10 ml IVP PRN PRN


   PRN Reason: AS NEEDED PER PROVIDER ORDERS





                                        





Sertraline [Zoloft] 75 mg PO DAILY 21 








Allergies/Adverse Reactions: 


                                    Allergies











Allergy/AdvReac Type Severity Reaction Status Date / Time


 


Latex, Natural Rubber Allergy  Rash Verified 21 17:43














Anes History & Medical History





- Anesthetic History


Anesthesia Complications: reports: No previous complications





- Medical History


Cardiovascular: reports: None


Pulmonary: reports: None


Gastrointestinal: reports: GERD


Urinary: reports: None


Neuro: reports: None


Musculoskeletal: reports: None


Endocrine/Autoimmune: reports: None


Blood Disorders: reports: None


Skin: reports: None


Smoking Status: Never smoker


Psychosocial: reports: Depression, Anxiety


History of Cancer?: No





- Surgical History


General: reports: Cholecystectomy





- Obstetrical History


: 6


Parity: 2


Prenatal Events: reports: None


Pregnancy Complications: reports: None





Exam


General: Alert, Oriented x3, Cooperative, No acute distress


Dental: WNL


Mouth Opening: 3 Fingerbreadth


Neck Mobility: Normal


Mallampati classification: III


Thyromental Distance: 4-6 cm


Mental/Cognitive Status: Alert/Oriented X3, Normal for patient





Plan


Anesthesia Type: Epidural


Consent for Procedure(s) Verified and Reviewed: Yes


Code Status: Attempt Resuscitation


ASA classification: 2-Mild systemic disease


Is this case an emergency?: No

## 2021-11-22 NOTE — HISTORY & PHYSICAL EXAMINATION
Prenatal Admit History





- Visit Reason


Visit Reason: Other





- Pregnancy


: 6


Parity: 2


Premature: 0


Ectopic: 0


: 3


Prenatal Care: positive: [facility]


Prenatal Risk/History: positive: None


Pregnancy Complications This Pregnancy: positive: None


Smoking Status: Never smoker





- Mother's Labs


Mother's Blood Type: positive: O


Mother's RH: positive: Positive


GBS: positive: Group B Step Negative


Rubella Status: positive: Immune





Meds/Allgy





- Home Medications


Home Medications: 


                                Ambulatory Orders











 Medication  Instructions  Recorded  Confirmed


 


Metoclopramide [Reglan] 10 mg PO Q6H PRN #20 tablet 21 


 


Sertraline [Zoloft] 75 mg PO DAILY 21














- Allergies


Allergies/Adverse Reactions: 


                                    Allergies











Allergy/AdvReac Type Severity Reaction Status Date / Time


 


Latex, Natural Rubber Allergy  Rash Verified 21 17:43














Review of Systems





- Constitutional


Constitutional: denies: Fatigue, Fever, Chills, Malaise





- Eyes


Eyes: denies: Blurred vision, Spots in vision, Dipolpia





- Cardiovascular


Cariovascular: denies: Irregular heart rate, Chest pain, Edema





- Gastrointestinal


Gastrointestinal: denies: Constipation, Diarrhea, Change in bowel habits, 

Nausea, Vomiting





- Integumentary


Integumentary: denies: Rash, Pruritis





- Neurological


Neurological: denies: Headache





Physical





- Abdominal Exam


Vital Signs: 





                                        











Temp Pulse Resp BP Pulse Ox


 


 37.0 C             


 


 21 17:20            











Contraction Frequency (min/apart): none


Uterine Resting Tone: positive: Soft





- Fetal Monitoring


Fetal Heart Rate Baseline: 140


Fetal Strip Review: positive: Category I





- Presentation


Presentation: positive: Vertex





- Vaginal Exam


Membranes: positive: Membranes intact


Dilation (in cm): 3


Effacement (%): 75


Station: positive: -1


Cervical Position: positive: Midposition





- Speculum Exam


Speculum Exam Performed: positive: No





Plan for Labor





- Plan For Labor


I expect patient to be DC'd or transferred within 96 hours.: Yes


Plan for Labor: 





Luca is a 25yo  @ 39.3wks gestation by 14.1wk U/S who presents to [facility] 

for elective IOL. She denies vaginal bleeding or leakage of fluid. She reports 

mild, regular contractions last night for several hours but she took a bath and 

then resolved. She reports +FM. SVE 3/75/-1, midposition. Vertex. 


She has been a patient of [facility] through the duration of her

 pregnancy which has remained uncomplicated with the exception of her increased 

anxiety. She was initially noted to have a marginal placenta previa however 

follow up ultrasound revealed inferior placental edge to be 9cm from internal 

cervical os. She also had a growth and TARSHA secondary to measuring size greater 

than dates which was also WNL. She is noted to have an obese BMI at 42.9.


She will be admitted to [facility] for elective induction of labor with pitocin. She 

will be monitored continuously. She intends an epidural for pain management and 

she is supported by her  Gabriel.





Dating criteria:


LMP unknown


Initial U/S @ 14.1wks dates pregnancy


Serial exams - agree





Medications: PNV; hydroxyzine 50mg PO q hs; sertraline 150mg PO once daily. 


Allergies: Latex





OB Hx:


G1: 2017, 42wks gestation, [facility], Female, 8lb4oz, IOL secondary 

to ICP


G2: 2018 10wk elective termination


G3: 2018 9wk SAB


G4: 2019 4wk SAB


G5: 2020 41.1wk , epidural [facility], Male, 0gh90nm


G6: current





PMHx: Anxiety, bipolar disorder, headaches/migraines


Surgical Hx: cholecystectomy


Social Hx: Never smoker. No ETOH or IVDA.  Gabriel.


Family Hx: Anxiety - sister, mother; diabetes - father, MGM, MGF, PGM, PGF; 

arthritis - father; lung cancer -MGM





Prenatal course:


BLANCA by LMP: unknown


Initial U/S:2021 @14w1d BLANCA 2021


FINAL BLANCA:  2021


O pos/Rubella imm


VZV:imm


Genetic testing: educated and declines


FAS:  posterior placenta with marginal placenta previa - follow up ultrasound 

ordered to be completed in 4 weeks. 3VC not documented - will request evaluation

 with follow up visit. Fetal anatomy WNL. Size c/w dating (EFW 62.7%) 


F/U  Inferior placental tip relationship to the internal os of the cervical 

canal is 9cm. There is no evidence of placenta previa. The marginal cord 

insertion is improved the prior study and the cord insertion is 1.7cm from the 

placental border. 3VC. Normal cardiac anatomy including ventricular outflow 

tracts.


Growth & TARSHA secondary to size>dates: WNL TARSHA 17.3; EFW 2570g (39.8%tile)


Glucola- 133


Influenza: 10/7/2021


TDAP 2021


COVID vaccine - NOT COMMITTED. Discussed in detail 10/7/21


GBS @ 37.0 wks NEG


HSV: denies self and partner


Breast pump Rx-


MOD: .  Gabriel, (Dulce 3.6yo, Waldemar 2yo) Having a girl- Caro! 


pp contraception: Mirena IUD


pap:10/09/2019- nilm





Physical Exam:


Normocephalic, atraumtic


Heart RRR w/o M/G/R


Lungs CTAB


Abdomen gravid, soft, nontender 


EFW 3600g


FHR baseline 140s, moderate variability, + accels, no decels


Occasional contractions appreciated via tocometry palpate mild with soft resting

 tone


SVE 3/75/-1, midposition. Vertex.


Bilateral LE's trace edema.





Assessment:


25yo  @ 39.3wks gestation by 14.1wk U/S


Elective IOL


FHR Category I


GBS neg





Plan:


Admit for active management.


Initiate pitocin with titration per protocol 


continuous fetal monitoring


Epidural per maternal request


Jacuzzi PRN. Nitrous oxide PRN.


Anticipate .

## 2021-11-23 RX ADMIN — Medication SCH: at 19:33

## 2021-11-23 RX ADMIN — SODIUM CHLORIDE, POTASSIUM CHLORIDE, SODIUM LACTATE AND CALCIUM CHLORIDE SCH: 600; 310; 30; 20 INJECTION, SOLUTION INTRAVENOUS at 19:32

## 2021-11-23 RX ADMIN — CALCIUM CARBONATE (ANTACID) CHEW TAB 500 MG PRN MG: 500 CHEW TAB at 22:31

## 2021-11-23 RX ADMIN — SODIUM CHLORIDE, POTASSIUM CHLORIDE, SODIUM LACTATE AND CALCIUM CHLORIDE SCH: 600; 310; 30; 20 INJECTION, SOLUTION INTRAVENOUS at 19:33

## 2021-11-23 RX ADMIN — CALCIUM CARBONATE (ANTACID) CHEW TAB 500 MG PRN MG: 500 CHEW TAB at 06:13

## 2021-11-23 RX ADMIN — ACETAMINOPHEN SCH MG: 500 TABLET ORAL at 22:29

## 2021-11-23 RX ADMIN — IBUPROFEN SCH MG: 800 TABLET, FILM COATED ORAL at 22:28

## 2021-11-23 RX ADMIN — IBUPROFEN SCH MG: 800 TABLET, FILM COATED ORAL at 14:25

## 2021-11-23 RX ADMIN — ACETAMINOPHEN SCH MG: 500 TABLET ORAL at 06:10

## 2021-11-23 RX ADMIN — ACETAMINOPHEN SCH MG: 500 TABLET ORAL at 14:24

## 2021-11-23 RX ADMIN — IBUPROFEN SCH MG: 800 TABLET, FILM COATED ORAL at 05:44

## 2021-11-23 RX ADMIN — CALCIUM CARBONATE (ANTACID) CHEW TAB 500 MG PRN MG: 500 CHEW TAB at 00:04

## 2021-11-23 RX ADMIN — SODIUM CHLORIDE, POTASSIUM CHLORIDE, SODIUM LACTATE AND CALCIUM CHLORIDE SCH MLS/HR: 600; 310; 30; 20 INJECTION, SOLUTION INTRAVENOUS at 00:04

## 2021-11-23 NOTE — DELIVERY NOTE
Delivery Note





- Labor


Labor: positive: Induced by oxytocin





- Infant Delivery Method


Infant Delivery Method: positive: Spontaneous vaginal delivery





- Birth Presentation


Birth Presentation: positive: Vertex, DELORIS - right occiput anterior





- Nuchal Cord


Nuchal Cord: positive: None





- Amniotic Fluid Description


Amniotic Fluid Description: positive: Clear





- Episiotomy Type


Episiotomy Type: positive: None





- Laceration


Laceration: positive: None





- Delivery Outcome


Delivery Outcome: positive: Livebirth





- [place]


[place]: positive: Placed in direct skin contact with mother, Stimulated, 

Warmed, [place] [facility] used


[place] sex: positive: Female





- Cord


Cord: positive: 3 vessels





- Placenta


Placenta: positive: Intact, Spontaneous





- Estimated Blood Loss


Estimated Blood Loss (in cc): 100





- Post Delivery Events


Post Delivery Events: positive: No post delivery events





- Delivery Comments (Free Text/Narrative)


Delivery Comments (Free Text/Narrative): 





Labor: This 25yo  @ 39.4wks gestation by 14wk U/S presented on 2021

for elective induction of labor. Cervix was 3/50/-1 and vertex. FHR pattern 

demonstrated Category I pattern throughout labor. Pitocin initiated for 

induction of labor with a maximum infusion rate of 7mU/mL. Epidural placed per 

maternal request. Normal labor course. AROM occurred at 1941 and was noted to be

a moderate amount of clear fluid. Pt progressed to c/c/+1 @ 0407 with onset of 

pushing at 0411. 


Birth: Fetal head delivered DELORIS at 0414 with slow delivery of left anterior 

shoulder which was delivered in 40 seconds by Enriqueta and suprapubic pressure.

Normal  of viable female infant on 2021 @ 0414. No nuchal cord. The 

 was placed on maternal abdomen, stimulated, dried, and placed skin to 

skin. Apgar's were 7/9 at 1 and 5 minutes respectively. Pitocin administered via

IV for hemostasis. The umbilical cord was allowed to stop pulsating at which 

time it was doubly clamped by CNM and cut by FOB. 3VC. Cord blood was obtained. 

Fundal massage and gentle cord traction applied for active management of the 

third stage. Placenta delivered spontaneously and intact @ 0417. EBL 100mL.


Fourth stage: Uterine fundus firm and there is no excessive bleeding. The 

perineum, vagina, and cervix were inspected and found to be intact. Skin to skin

contact maintained. Family bonding well. Both mother and baby were left in 

stable condition.

## 2021-11-23 NOTE — ANESTHESIA PROCEDURE NOTE
Anesthesia Epidural Template





- Patient Report


Patient Reports: positive: Inadequate control





- Other Comments


Other Comments: 





Patient is having 10/10 pain with contractions, focused on right side. Turned to

right side and epidural bolused with 5ml of 2% Lidocaine, 100mcg fentanyl and 

3ml of PF NS. Catheter was noted to be at 9cm at the skin. Initial placement was

at 12cm at the skin. Patient reported improvement of contraction pain. After 

exam by RN, patient was noted to be complete. CNM here to deliver baby.

## 2021-11-24 VITALS — SYSTOLIC BLOOD PRESSURE: 121 MMHG | DIASTOLIC BLOOD PRESSURE: 80 MMHG

## 2021-11-24 RX ADMIN — ACETAMINOPHEN SCH MG: 500 TABLET ORAL at 06:29

## 2021-11-24 RX ADMIN — IBUPROFEN SCH MG: 800 TABLET, FILM COATED ORAL at 06:28

## 2021-11-24 NOTE — DISCHARGE SUMMARY
Discharge Summary


Condition at Discharge: Good


Discharge Disposition: 01 Home, Self Care





- HOSPITAL COURSE


Hospital Course: 





Date of Admission: 2021





Date of Discharge: 2021





Diagnosis on Admission:


1: 27yo  @ 39.3wks gestation by 14.1wk U/S


2. Elective IOL


3. FHR Category I


4. GBS neg





Diagnosis on Discharge:


1: 27yo  PPD#1 s/p TSVD viable female infant


2. Breastfeeding


3. Normal recovery





Brief History:


She is a patient of [facility] who presented on 2021 for 

elective IOL. Cervix was 3/50/-1 and vertex. Pitocin was initiated for induction

of labor with a maximum infusion rate of 7mU/mL. Epidural placed per maternal 

request. Pt progressed to spontaneously deliver a viable female infant on 

2021 @ 0414. Apgars were 7/9 at 1 and 5 minutes respectively. EBL 100mL. 

She received 800mcg BC misoprostol approximately 1 hour after delivery when up 

to the bathroom secondary to moderate postpartum bleeding which then resolved. 

EBL after delivery was 140mL for a total EBL of 240mL.


She has been doing well in her postpartum course. She is ambulating and 

tolerating a regular diet. She is urinating without difficulty and her lochia is

normal. Her pain is well controlled with oral medications. She is breastfeeding 

without difficulty and she is bonding well with her baby. She will be discharged

home today on postpartum day #1 with instructions to continue taking her 

prenatal vitamin while breastfeeding and to continue taking ibuprofen and 

tylenol OTC as needed for pain management. She is also taking 150mg sertraline 

daily and intends to continue taking this as well. She intends to follow up with

myself at [facility] in 1 week for routine postpartum visit or 

sooner if needed. She has been given precautions to call if she has any 

worsening fevers, chills, abdominal pain, increased vaginal bleeding or foul 

smelling vaginal lochia. She verbalized understanding and agrees to above plan. 

She denies further questions or concerns at this time.











Physical Exam:


Normocephalic, atraumatic, heart RRR w/o M/G/R, lungs CTAB, abdomen soft and 

nontender, fundus firm at U, perineum intact, light lochia rubra, bilateral LE's

trace edema. Mood is good.





- ALLERGIES


Allergies/Adverse Reactions: 


                                    Allergies











Allergy/AdvReac Type Severity Reaction Status Date / Time


 


Latex, Natural Rubber Allergy  Rash Verified 21 17:43














- MEDICATIONS


Home Medications: 


                                Ambulatory Orders











 Medication  Instructions  Recorded  Confirmed


 


Metoclopramide [Reglan] 10 mg PO Q6H PRN #20 tablet 21 


 


Sertraline [Zoloft] 75 mg PO DAILY 21














- LABS


Result Diagrams: 


                                 21 17:05

## 2021-11-24 NOTE — DISCHARGE PLAN
Discharge Plan


Problem Reviewed?: Yes


Disposition: 01 Home, Self Care


Condition: Good


Diet: Regular


Activity Restrictions: No Restrictions


Shower Restrictions: No


Driving Restrictions: No


Weight Bearing: Full Weight


Instruction Topics:  Birth Vaginal After


No Smoking: If you smoke, Please STOP!  Call [phone] for help.


Follow-up with: 


Aura Walsh CNM, ARNP [Provider Admit Priv/Credential] - 1 Week

## 2022-02-26 ENCOUNTER — HOSPITAL ENCOUNTER (EMERGENCY)
Dept: HOSPITAL 76 - ED | Age: 27
Discharge: HOME | End: 2022-02-26
Payer: COMMERCIAL

## 2022-02-26 VITALS — SYSTOLIC BLOOD PRESSURE: 138 MMHG | DIASTOLIC BLOOD PRESSURE: 80 MMHG

## 2022-02-26 DIAGNOSIS — K29.20: Primary | ICD-10-CM

## 2022-02-26 LAB
ALBUMIN DIAFP-MCNC: 3.9 G/DL (ref 3.2–5.5)
ALBUMIN/GLOB SERPL: 1 {RATIO} (ref 1–2.2)
ALP SERPL-CCNC: 105 IU/L (ref 42–121)
ALT SERPL W P-5'-P-CCNC: 31 IU/L (ref 10–60)
ANION GAP SERPL CALCULATED.4IONS-SCNC: 9 MMOL/L (ref 6–13)
AST SERPL W P-5'-P-CCNC: 24 IU/L (ref 10–42)
BASOPHILS NFR BLD AUTO: 0 10^3/UL (ref 0–0.1)
BASOPHILS NFR BLD AUTO: 0.3 %
BILIRUB BLD-MCNC: 0.7 MG/DL (ref 0.2–1)
BUN SERPL-MCNC: 14 MG/DL (ref 6–20)
CALCIUM UR-MCNC: 9.5 MG/DL (ref 8.5–10.3)
CHLORIDE SERPL-SCNC: 106 MMOL/L (ref 101–111)
CLARITY UR REFRACT.AUTO: CLEAR
CO2 SERPL-SCNC: 27 MMOL/L (ref 21–32)
CREAT SERPLBLD-SCNC: 0.7 MG/DL (ref 0.4–1)
EOSINOPHIL # BLD AUTO: 0.5 10^3/UL (ref 0–0.7)
EOSINOPHIL NFR BLD AUTO: 5.2 %
ERYTHROCYTE [DISTWIDTH] IN BLOOD BY AUTOMATED COUNT: 14 % (ref 12–15)
GFRSERPLBLD MDRD-ARVRAT: 102 ML/MIN/{1.73_M2} (ref 89–?)
GLOBULIN SER-MCNC: 4 G/DL (ref 2.1–4.2)
GLUCOSE SERPL-MCNC: 118 MG/DL (ref 70–100)
GLUCOSE UR QL STRIP.AUTO: NEGATIVE MG/DL
HCG UR QL: NEGATIVE
HCT VFR BLD AUTO: 43.6 % (ref 37–47)
HGB UR QL STRIP: 14 G/DL (ref 12–16)
KETONES UR QL STRIP.AUTO: NEGATIVE MG/DL
LIPASE SERPL-CCNC: 25 U/L (ref 22–51)
LYMPHOCYTES # SPEC AUTO: 2.6 10^3/UL (ref 1.5–3.5)
LYMPHOCYTES NFR BLD AUTO: 24.9 %
MCH RBC QN AUTO: 26.3 PG (ref 27–31)
MCHC RBC AUTO-ENTMCNC: 32.1 G/DL (ref 32–36)
MCV RBC AUTO: 82 FL (ref 81–99)
MONOCYTES # BLD AUTO: 0.7 10^3/UL (ref 0–1)
MONOCYTES NFR BLD AUTO: 6.8 %
NEUTROPHILS # BLD AUTO: 6.4 10^3/UL (ref 1.5–6.6)
NEUTROPHILS # SNV AUTO: 10.3 X10^3/UL (ref 4.8–10.8)
NEUTROPHILS NFR BLD AUTO: 62.5 %
NITRITE UR QL STRIP.AUTO: NEGATIVE
NRBC # BLD AUTO: 0 /100WBC
NRBC # BLD AUTO: 0 X10^3/UL
PDW BLD AUTO: 10 FL (ref 7.9–10.8)
PH UR STRIP.AUTO: 6 PH (ref 5–7.5)
PLATELET # BLD: 298 10^3/UL (ref 130–450)
POTASSIUM SERPL-SCNC: 4.1 MMOL/L (ref 3.5–5)
PROT SPEC-MCNC: 7.9 G/DL (ref 6.7–8.2)
PROT UR STRIP.AUTO-MCNC: NEGATIVE MG/DL
RBC # UR STRIP.AUTO: NEGATIVE /UL
RBC MAR: 5.32 10^6/UL (ref 4.2–5.4)
SODIUM SERPLBLD-SCNC: 142 MMOL/L (ref 135–145)
SP GR UR STRIP.AUTO: 1.02 (ref 1–1.03)
UROBILINOGEN UR QL STRIP.AUTO: (no result) E.U./DL
UROBILINOGEN UR STRIP.AUTO-MCNC: NEGATIVE MG/DL

## 2022-02-26 PROCEDURE — 81003 URINALYSIS AUTO W/O SCOPE: CPT

## 2022-02-26 PROCEDURE — 81025 URINE PREGNANCY TEST: CPT

## 2022-02-26 PROCEDURE — 80053 COMPREHEN METABOLIC PANEL: CPT

## 2022-02-26 PROCEDURE — 87086 URINE CULTURE/COLONY COUNT: CPT

## 2022-02-26 PROCEDURE — 81001 URINALYSIS AUTO W/SCOPE: CPT

## 2022-02-26 PROCEDURE — 99282 EMERGENCY DEPT VISIT SF MDM: CPT

## 2022-02-26 PROCEDURE — 36415 COLL VENOUS BLD VENIPUNCTURE: CPT

## 2022-02-26 PROCEDURE — 85025 COMPLETE CBC W/AUTO DIFF WBC: CPT

## 2022-02-26 PROCEDURE — 99283 EMERGENCY DEPT VISIT LOW MDM: CPT

## 2022-02-26 PROCEDURE — 83690 ASSAY OF LIPASE: CPT

## 2022-02-26 NOTE — ED PHYSICIAN DOCUMENTATION
PD HPI ABD PAIN





- Stated complaint


Stated Complaint: ABD PX





- Chief complaint


Chief Complaint: Abd Pain





- History obtained from


History obtained from: Patient





- Additional information


Additional information: 





Abdominal pain, may be focused in the left upper abdomen since yesterday.  Is 

associated with nausea but no vomiting and no real changes in her bowel 

movements.  Get worse after eating breakfast this morning.  She had a remote 

cholecystectomy 3 years ago.  No possibility of pregnancy.  No other abdominal 

surgeries.





Review of Systems


Constitutional: reports: Reviewed and negative


Eyes: reports: Reviewed and negative


Ears: reports: Reviewed and negative


Nose: reports: Reviewed and negative


Throat: reports: Reviewed and negative





PD PAST MEDICAL HISTORY





- Past Medical History


Cardiovascular: None


Respiratory: None


Neuro: None


Endocrine/Autoimmune: None


GI: GERD


: None


Musculoskeletal: None


Derm: None





- Past Surgical History


Past Surgical History: Yes


General: Cholecystectomy





- Present Medications


Home Medications: 


                                Ambulatory Orders











 Medication  Instructions  Recorded  Confirmed


 


Metoclopramide [Reglan] 10 mg PO Q6H PRN #20 tablet 04/23/21 


 


Sertraline [Zoloft] 75 mg PO DAILY 04/23/21 04/23/21


 


HYDROcod/ACETAM 5/325 [Norco 5/325] 1 - 2 tab PO Q6H PRN #10 tablet 02/26/22 


 


Omeprazole 40 mg PO DAILY #30 cap 02/26/22 














- Allergies


Allergies/Adverse Reactions: 


                                    Allergies











Allergy/AdvReac Type Severity Reaction Status Date / Time


 


Latex, Natural Rubber Allergy  Rash Verified 02/26/22 17:58














- Social History


Does the pt smoke?: No


Smoking Status: Never smoker


Does the pt drink ETOH?: Yes


Does the pt have substance abuse?: No





- Immunizations


Immunizations are current?: Yes





- POLST


Patient has POLST: No





PD ED PE NORMAL





- Vitals


Vital signs reviewed: Yes





- General


General: Alert and oriented X 3, No acute distress





- Abdomen


Abdomen: Normal bowel sounds, Soft, Other (Very mild upper abdominal and left 

upper quadrant tenderness without surgical signs, no diffuse tenderness.)





- Neuro


Neuro: Alert and oriented X 3, Normal speech





Results





- Vitals


Vitals: 


                               Vital Signs - 24 hr











  02/26/22 02/26/22





  17:54 17:58


 


Temperature 35.7 C L 36.5 C


 


Heart Rate 75 75


 


Respiratory 16 16





Rate  


 


Blood Pressure 139/79 H 139/79 H


 


O2 Saturation 100 100








                                     Oxygen











O2 Source                      Room air

















- Labs


Labs: 


                                Laboratory Tests











  02/26/22 02/26/22 02/26/22





  18:00 18:15 18:15


 


WBC   10.3 


 


RBC   5.32 


 


Hgb   14.0 


 


Hct   43.6 


 


MCV   82.0 


 


MCH   26.3 L 


 


MCHC   32.1 


 


RDW   14.0 


 


Plt Count   298 


 


MPV   10.0 


 


Neut # (Auto)   6.4 


 


Lymph # (Auto)   2.6 


 


Mono # (Auto)   0.7 


 


Eos # (Auto)   0.5 


 


Baso # (Auto)   0.0 


 


Absolute Nucleated RBC   0.00 


 


Nucleated RBC %   0.0 


 


Sodium    142


 


Potassium    4.1


 


Chloride    106


 


Carbon Dioxide    27


 


Anion Gap    9.0


 


BUN    14


 


Creatinine    0.7


 


Estimated GFR (MDRD)    102


 


Glucose    118 H


 


Calcium    9.5


 


Total Bilirubin    0.7


 


AST    24


 


ALT    31


 


Alkaline Phosphatase    105


 


Total Protein    7.9


 


Albumin    3.9


 


Globulin    4.0


 


Albumin/Globulin Ratio    1.0


 


Lipase    25


 


Urine Color  YELLOW  


 


Urine Clarity  CLEAR  


 


Urine pH  6.0  


 


Ur Specific Gravity  1.020  


 


Urine Protein  NEGATIVE  


 


Urine Glucose (UA)  NEGATIVE  


 


Urine Ketones  NEGATIVE  


 


Urine Occult Blood  NEGATIVE  


 


Urine Nitrite  NEGATIVE  


 


Urine Bilirubin  NEGATIVE  


 


Urine Urobilinogen  0.2 (NORMAL)  


 


Ur Leukocyte Esterase  NEGATIVE  


 


Ur Microscopic Review  NOT INDICATED  


 


Urine Culture Comments  NOT INDICATED  


 


Urine HCG, Qual  NEGATIVE  














PD MEDICAL DECISION MAKING





- ED course


ED course: 





26-year-old woman with upper abdominal pain since yesterday.  Seemed fairly 

benign on exam and her labs were unremarkable.  She did get relief from a GI 

cocktail suggesting gastric source and she admits to some heavier than usual 

alcohol use lately so suspect she may have a mild case of alcoholic gastritis.





Departure





- Departure


Disposition: 01 Home, Self Care


Clinical Impression: 


Gastritis


Qualifiers:


 Gastritis type: alcoholic Chronicity: acute Gastritis bleeding: without 

bleeding Qualified Code(s): K29.20 - Alcoholic gastritis without bleeding





Condition: Good


Record reviewed to determine appropriate education?: Yes


Instructions:  ED Gastritis


Prescriptions: 


HYDROcod/ACETAM 5/325 [Norco 5/325] 1 - 2 tab PO Q6H PRN #10 tablet


 PRN Reason: Pain


Omeprazole 40 mg PO DAILY #30 cap


Comments: 


As discussed, you should abstain from alcohol as this will make this worse, also

avoid nonsteroidal anti-inflammatory drugs such as ibuprofen or Aleve.  Return 

for new or worsening symptoms.  I sent your prescriptions electronically to 

[facility] in [place].





I am prescribing a short course of narcotic pain medication for you. These are 

potentially dangerous and addictive medications that should be used carefully.


These medications may constipate you. Take an over-the-counter stool softener 

(docusate) twice daily with plenty of water while taking these medications. If 

you go 24 hours without a bowel movement, take over-the-counter miralax, per 

package instructions.


Do not drink or drive while taking these medications.


If you received narcotic or sedating medications while in the emergency 

department, do not drive for 24 hours.


Store this medication in a safe, secure place and out of reach of children.


It is a violation of federal law to give or sell this medication to another 

person or to use in a manner other than prescribed.


The ED will not refill narcotic prescriptions, including prescriptions lost or 

stolen.


To dispose of unwanted medications:


1. [facility] at [address] in 

[place] has a medication drop box. They accept prescription medications (in 

pill form) Monday through Friday 9:00 a.m. to 5:00 p.m.


2. The [facility] [employer] accepts prescription medications (in

pill form only) for disposal year round. Call [phone] for more 

information.


3. Contact the [facility] for the next [facility] sponsored prescription 

drug collection event. [phone], [phone], or [phone] 

[phone];


Note that many narcotic pain relievers also contain Tylenol/acetaminophen.  

Please ensure that your total dose of acetaminophen from all sources does not 

exceed 3 g (3000 mg) per day.

## 2022-09-30 ENCOUNTER — HOSPITAL ENCOUNTER (EMERGENCY)
Dept: HOSPITAL 76 - ED | Age: 27
Discharge: LEFT BEFORE DISCHARGE | End: 2022-09-30
Payer: COMMERCIAL

## 2022-09-30 VITALS — DIASTOLIC BLOOD PRESSURE: 74 MMHG | SYSTOLIC BLOOD PRESSURE: 123 MMHG

## 2022-09-30 DIAGNOSIS — Z53.21: Primary | ICD-10-CM
